# Patient Record
Sex: FEMALE | Race: WHITE | NOT HISPANIC OR LATINO | Employment: STUDENT | URBAN - METROPOLITAN AREA
[De-identification: names, ages, dates, MRNs, and addresses within clinical notes are randomized per-mention and may not be internally consistent; named-entity substitution may affect disease eponyms.]

---
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
## 2025-01-02 ENCOUNTER — OFFICE VISIT (OUTPATIENT)
Dept: PHYSICAL THERAPY | Facility: REHABILITATION | Age: 20
End: 2025-01-02
Payer: COMMERCIAL

## 2025-01-02 DIAGNOSIS — M76.899 QUADRICEPS TENDONITIS: Primary | ICD-10-CM

## 2025-01-02 DIAGNOSIS — G89.29 CHRONIC PAIN OF LEFT KNEE: ICD-10-CM

## 2025-01-02 DIAGNOSIS — M25.562 CHRONIC PAIN OF LEFT KNEE: ICD-10-CM

## 2025-01-02 PROCEDURE — 97112 NEUROMUSCULAR REEDUCATION: CPT

## 2025-01-02 PROCEDURE — 97110 THERAPEUTIC EXERCISES: CPT

## 2025-01-02 PROCEDURE — 97140 MANUAL THERAPY 1/> REGIONS: CPT

## 2025-01-02 NOTE — PROGRESS NOTES
"Daily Note     Today's date: 2025  Patient name: Radha Trevino  : 2005  MRN: [id number]  Referring provider: Nawaf Fontaine DO  Dx:   Encounter Diagnosis     ICD-10-CM    1. Quadriceps tendonitis  M76.899       2. Chronic pain of left knee  M25.562     G89.29           Start Time: 1515  Stop Time: 1600  Total time in clinic (min): 45 minutes    Subjective: Pt states she feels great today and has no pain.       Objective: See treatment diary below      Assessment: Pt was able to progress all exercises performed during today's session with no form compensations or increase in sx. During eccentric step downs and Goblet squats, mild genu valgus observed B/l. Pt was able to mildly correct with VC/TC. Pt has demonstrated decreased sx irritability over the last several sessions, and PT frequency will drop to 1x/week starting this upcoming week. Will monitor status NV and progress as appropriate. Tolerated treatment well. Patient demonstrated fatigue post treatment and would benefit from continued PT      Plan: Continue per plan of care.  Progress treatment as tolerated.  Plan to decreased PT frequency to 1x/week as pt is progressing well and demonstrating decreased symptom irritability.       POC expires Unit limit Auth Expiration date PT/OT + Visit Limit?   3/13/2025  -  BOMN         Visit/Unit Tracking  AUTH Status:  Date 2024      [facility] [facility]  Used 1 2 3       Remaining             Pertinent Findings:      Test / Measure  2024   FOTO (Predicted 99) 98   Decreased Knee Eccentric Control Poor squat mechanics during functional squat       Precautions: None    Access Code: [email or web address]  URL: [email or web address]  Date: 2024  Prepared by: Yair Franklin    Manuals  12          IASTM to quad  NV BM 8'                                                 Neuro Re-Ed             Patient Education 15'            Eccentric STS 10 reps 5\"  1x10 5# DB    2x10 " "10# DB           Gastroc Stretch  3x30\" b/l on slant board 3x30\" on slant board          Quad Stretch 2 bouts 30 sec 2x30\" ea           Eccentric Heel taps   2x10 B                       Ther Ex             TM Warm up  8'   7.5 incline 8' 8.0 incline          Bridges 2x10 single leg 1x10 on pball    1x10 on pball with HS curl 3x10 on pball with HS curl          Reverse Hyper Extension 10x 1x15 BW    1x15 BW with YTB abduction 1x12 YTB  1x12 OTB          Clamshells 2x10 reverse clamshells OTB 2x10 reverse clamshells  GTB 2x12 reverse clamshells BTB          Goblet Squats   1x10 10#    1x10 12#          Lunges  2x10           Titan Knee Extension   2x10 50#          Titan Knee Flexion             RDLs   2x8 15#KB          Side Stepping  3 laps GTB 3 laps GTB          Leg Press  1x10 40#   2x10 70# 1x10 70#  1x10 85#          Ther Activity                                       Gait Training                                       Modalities                                            "

## 2025-01-06 ENCOUNTER — OFFICE VISIT (OUTPATIENT)
Dept: PHYSICAL THERAPY | Facility: REHABILITATION | Age: 20
End: 2025-01-06
Payer: COMMERCIAL

## 2025-01-06 DIAGNOSIS — M76.899 QUADRICEPS TENDONITIS: Primary | ICD-10-CM

## 2025-01-06 DIAGNOSIS — G89.29 CHRONIC PAIN OF LEFT KNEE: ICD-10-CM

## 2025-01-06 DIAGNOSIS — M25.562 CHRONIC PAIN OF LEFT KNEE: ICD-10-CM

## 2025-01-06 PROCEDURE — 97110 THERAPEUTIC EXERCISES: CPT

## 2025-01-06 PROCEDURE — 97140 MANUAL THERAPY 1/> REGIONS: CPT

## 2025-01-06 PROCEDURE — 97112 NEUROMUSCULAR REEDUCATION: CPT

## 2025-01-06 NOTE — PROGRESS NOTES
"Daily Note     Today's date: 2025  Patient name: Radha Trevino  : 2005  MRN: [id number]  Referring provider: Nawaf Fontaine DO  Dx:   Encounter Diagnosis     ICD-10-CM    1. Quadriceps tendonitis  M76.899       2. Chronic pain of left knee  M25.562     G89.29           Start Time: 1515  Stop Time: 1555  Total time in clinic (min): 40 minutes    Subjective: Pt denies any pain nor symptoms throughout tx.       Objective: See treatment diary below      Assessment: Pt had no pain nor exacerbations of symptoms throughout nor post tx. Pt will DC to HEP at next visit. HEP and DOMS were both reviewed. Tolerated treatment well. Patient demonstrated fatigue post treatment and would benefit from continued PT      Plan: Continue per plan of care.  Progress treatment as tolerated.  Plan to decreased PT frequency to 1x/week as pt is progressing well and demonstrating decreased symptom irritability.       POC expires Unit limit Auth Expiration date PT/OT + Visit Limit?   3/13/2025  -  BOMN         Visit/Unit Tracking  AUTH Status:  Date 2024     [employer]  Used 1 2 3 4      Remaining             Pertinent Findings:      Test / Measure  2024   FOTO (Predicted 99) 98   Decreased Knee Eccentric Control Poor squat mechanics during functional squat       Precautions: None    Access Code: [email or web address]  URL: [email or web address]  Date: 2024  Prepared by: Yair Franklin    Manuals          IASTM to quad  NV BM 8'                                                 Neuro Re-Ed             Patient Education 15'            Eccentric STS 10 reps 5\"  1x10 5# DB    2x10 10# DB           Gastroc Stretch  3x30\" b/l on slant board 3x30\" on slant board 3x30\" on slant board         Quad Stretch 2 bouts 30 sec 2x30\" ea           Eccentric Heel taps   2x10 B 2x10 B                      Ther Ex             TM Warm up  8'   7.5 incline 8' 8.0 incline 8' 8.0 incline      "    Bridges 2x10 single leg 1x10 on pball    1x10 on pball with HS curl 3x10 on pball with HS curl 3x10 on pball with HS curl         Reverse Hyper Extension 10x 1x15 BW    1x15 BW with YTB abduction 1x12 YTB  1x12 OTB 2x12 OTB         Clamshells 2x10 reverse clamshells OTB 2x10 reverse clamshells  GTB 2x12 reverse clamshells BTB 2x12 reverse clamshells BTB         Goblet Squats   1x10 10#    1x10 12#          Lunges  2x10           Titan Knee Extension   2x10 50# 2x10 50#         Titan Knee Flexion             RDLs   2x8 15#KB 2x8 15#KB         Side Stepping  3 laps GTB 3 laps GTB 3 laps BTB         Leg Press  1x10 40#   2x10 70# 1x10 70#  1x10 85# 2x10 85#         Ther Activity                                       Gait Training                                       Modalities

## 2025-01-09 ENCOUNTER — APPOINTMENT (OUTPATIENT)
Dept: PHYSICAL THERAPY | Facility: REHABILITATION | Age: 20
End: 2025-01-09
Payer: COMMERCIAL

## 2025-01-13 ENCOUNTER — EVALUATION (OUTPATIENT)
Dept: PHYSICAL THERAPY | Facility: REHABILITATION | Age: 20
End: 2025-01-13
Payer: COMMERCIAL

## 2025-01-13 DIAGNOSIS — M76.899 QUADRICEPS TENDONITIS: Primary | ICD-10-CM

## 2025-01-13 DIAGNOSIS — G89.29 CHRONIC PAIN OF LEFT KNEE: ICD-10-CM

## 2025-01-13 DIAGNOSIS — M25.562 CHRONIC PAIN OF LEFT KNEE: ICD-10-CM

## 2025-01-13 PROCEDURE — 97112 NEUROMUSCULAR REEDUCATION: CPT

## 2025-01-13 PROCEDURE — 97110 THERAPEUTIC EXERCISES: CPT

## 2025-01-13 PROCEDURE — 97164 PT RE-EVAL EST PLAN CARE: CPT

## 2025-01-13 NOTE — PROGRESS NOTES
"Discharge Summary    Today's date: 2025  Patient name: Radha Trevino  : 2005  MRN: [id number]  Referring provider: Nawaf Fontaine DO  Dx:   Encounter Diagnosis     ICD-10-CM    1. Quadriceps tendonitis  M76.899       2. Chronic pain of left knee  M25.562     G89.29             Start Time: 1545  Stop Time: 1630  Total time in clinic (min): 45 minutes    Subjective: Pt states she has been feeling better, and has \"had no achiness at all in her left knee\". Pt has no difficulty performing any ADLs at this point, and she has been able to work pain free. She feels as though she is back to baseline and would like to be discharged at this point. Pt states she is 100% of where she was hoping to get to at the end of her PT services.       Pain:  Current: 0/10  Best: 0/10  Worst: 0/10    Objective: See treatment diary below  Range of Motion: Goniometric revealed the following findings (in degrees).  Joint Motion Right: 2024 Left: 2024 Right: 2024 Left: 2024   Hip Extension WNL WNL WNL WNL   Hip External Rotation WNL WNL WNL WNL   Hip Internal Rotation WNL WNL WNL WNL   Knee Extension WNL WNL WNL WNL   Knee Flexion WNL WNL WNL WNL   Ankle Dorsiflexion WNL WNL WNL WNL   Ankle Plantarflexion WNL WNL WNL WNL     Strength: MMT revealed the following findings.  Joint Motion Right: 2024 Left: 2024 Right: 2024 Left: 2024   Hip Flexion 5/5 4+/5 5/5 5/5   Hip Abduction 4+/5 4/5 5/5 5/5   Hip Extension 5/5 4+/5 5/5 5/5   Knee Extension 5/5 4+/5 5/5 5/5   Knee Flexion 5/5 4+/5 5/5 5/5   Ankle Plantarflexion 5/5 4/5 5/5 5/5   Ankle Dorsiflexion 5/5 4-/5 5/5 5/5       Additional Assessments:  Observation: Nodule observed anterolaterally on the quadriceps tendon, no significant change in size observed from the IE.  Palpation: No significant TTP along left knee musculature, bony landmarks, or nodule.  Joint Mobility: Patellar ROM appeared within normal limits on the LLE with no " increase in sx.  Gait Pattern: No significant findings or pain noted.       Functional Strength/Mobility Exam:   Functional Squat: Significant trunk lean anteriorly, b/l genu valgus. Crepitus observed on the right knee with deep knee flexion.   30 second STS: 19 reps  5xSTS: 6.41 seconds      Special Tests:  Hip Tests (Structure evaluated) Date: 12/19/2024  ( P / N ) Date: 1/13/2024   Alli (Iliapsoas/Rectus Fem) Negative B/l  Negative b/l   90/90 SLR (Hamstring) Negative B/l Negative b/l   Ely (Rectus Femoris) Negative Negative     Knee Tests (Structure evaluated) Date: 12/19/2024  ( P / N )    Lachman (ACL) N left N left   Anterior (ACL) N left N left   Posterior Drawer (PCL) N left N left   Sag (PCL) N left N left   Valgus Stress (MCL) N left N left   Varus Stress (LCL) N left N left   Apley Compression (Menisci) N left NT   Gal (Menisci) N left NT   Thessaly (Menisci) N left NT   Patellar Apprehension   (Patellar Subluxation) N left N left       Assessment: Radha Trevino is a 19 y.o. female being seen at [facility] for PT re-evaluation. Pt states they have made significant progress t/o the current PT plan of care thus far. Pt states they current have no limitations or difficulty with any activities. Pt states they are 100% to their desired level with PT services thus far. Strength assessment showed improved gross b/l LE strength with no reproduction of sx during testing. Patient is able to manage all sx independently and demonstrates good understanding of and proper form with each of the exercises included in HEP. Patient has met all personal and established goals for PT and is deemed safe for d/c. POC will be closed and pt will be provided with a comprehensive HEP to continue performing following d/c. The patient was instructed to contact PT for any change in status.      Plan: Plan to d/c pt from PT services and provide with a comprehensive HEP to perform after discharge.       POC expires Unit limit Auth  "Expiration date PT/OT + Visit Limit?   3/13/2025  -  BOMN         Visit/Unit Tracking  AUTH Status:  Date 12/19/2024 12/30 1/2 1/6 1/13    [employer]  Used 1 2 3 4 5     Remaining             Pertinent Findings:      Test / Measure  12/19/2024 1/13/2025   FOTO (Predicted 99) 98 99   Decreased Knee Eccentric Control Poor squat mechanics during functional squat Poor Squat mechanics during functional squat       Precautions: None    Access Code: [email or web address]  URL: [email or web address]  Date: 12/19/2024  Prepared by: Yair Franklin    Manuals 12/19 12/30 1/2 1/6 1/13        IASTM to quad  NV BM 8'                                                 Neuro Re-Ed             Patient Education 15'            Eccentric STS 10 reps 5\"  1x10 5# DB    2x10 10# DB   2x10 (box squat)        Gastroc Stretch  3x30\" b/l on slant board 3x30\" on slant board 3x30\" on slant board 2x45\" on slant board        Quad Stretch 2 bouts 30 sec 2x30\" ea           Eccentric Heel taps   2x10 B 2x10 B 2x10 on left                     Ther Ex             TM Warm up  8'   7.5 incline 8' 8.0 incline 8' 8.0 incline 5' 8.0 incline        Bridges 2x10 single leg 1x10 on pball    1x10 on pball with HS curl 3x10 on pball with HS curl 3x10 on pball with HS curl 3x10 on pball with HS curl        Reverse Hyper Extension 10x 1x15 BW    1x15 BW with YTB abduction 1x12 YTB  1x12 OTB 2x12 OTB 3x10 OTB        Clamshells 2x10 reverse clamshells OTB 2x10 reverse clamshells  GTB 2x12 reverse clamshells BTB 2x12 reverse clamshells BTB         Goblet Squats   1x10 10#    1x10 12#          Lunges  2x10           Titan Knee Extension   2x10 50# 2x10 50#         Titan Knee Flexion             RDLs   2x8 15#KB 2x8 15#KB         Side Stepping  3 laps GTB 3 laps GTB 3 laps BTB         Leg Press  1x10 40#   2x10 70# 1x10 70#  1x10 85# 2x10 85#         Ther Activity                                       Gait Training                                     "   Modalities

## 2025-03-29 DIAGNOSIS — Z97.5 BREAKTHROUGH BLEEDING ON NEXPLANON: ICD-10-CM

## 2025-03-29 DIAGNOSIS — N92.1 BREAKTHROUGH BLEEDING ON NEXPLANON: ICD-10-CM

## 2025-03-29 DIAGNOSIS — Z30.011 ENCOUNTER FOR INITIAL PRESCRIPTION OF CONTRACEPTIVE PILLS: ICD-10-CM

## 2025-03-31 RX ORDER — DROSPIRENONE AND ETHINYL ESTRADIOL 0.02-3(28)
1 KIT ORAL DAILY
Qty: 28 TABLET | Refills: 0 | Status: SHIPPED | OUTPATIENT
Start: 2025-03-31

## 2025-04-23 DIAGNOSIS — Z97.5 BREAKTHROUGH BLEEDING ON NEXPLANON: ICD-10-CM

## 2025-04-23 DIAGNOSIS — Z30.011 ENCOUNTER FOR INITIAL PRESCRIPTION OF CONTRACEPTIVE PILLS: ICD-10-CM

## 2025-04-23 DIAGNOSIS — N92.1 BREAKTHROUGH BLEEDING ON NEXPLANON: ICD-10-CM

## 2025-04-23 NOTE — TELEPHONE ENCOUNTER
Was not made aware she needed an appointment. Patient warmed transferred to scheduling at this time. Inquiring on a refill to get her to that time.     Reason for call:   [x] Refill   [] Prior Auth  [] Other:     Office:   [] PCP/Provider -   [x] Specialty/Provider - Ana Wilson PA-C / [facility] [place]     Medication: drospirenone-ethinyl estradiol (Vestura) 3-0.02 MG per tablet / TAKE 1 TABLET BY MOUTH EVERY DAY     Pharmacy: [facility]/pharmacy #[id number] - [place], [place] - [address]     Local Pharmacy   Does the patient have enough for 3 days?   [] Yes   [x] No - Send as HP to POD

## 2025-04-24 RX ORDER — DROSPIRENONE AND ETHINYL ESTRADIOL 0.02-3(28)
1 KIT ORAL DAILY
Qty: 28 TABLET | Refills: 0 | Status: SHIPPED | OUTPATIENT
Start: 2025-04-24 | End: 2025-04-29 | Stop reason: SDUPTHER

## 2025-04-29 ENCOUNTER — OFFICE VISIT (OUTPATIENT)
Dept: OBGYN CLINIC | Facility: CLINIC | Age: 20
End: 2025-04-29
Payer: COMMERCIAL

## 2025-04-29 VITALS
SYSTOLIC BLOOD PRESSURE: 112 MMHG | HEIGHT: 63 IN | DIASTOLIC BLOOD PRESSURE: 80 MMHG | BODY MASS INDEX: 17.4 KG/M2 | WEIGHT: 98.2 LBS

## 2025-04-29 DIAGNOSIS — N92.1 BREAKTHROUGH BLEEDING ON NEXPLANON: ICD-10-CM

## 2025-04-29 DIAGNOSIS — Z97.5 BREAKTHROUGH BLEEDING ON NEXPLANON: ICD-10-CM

## 2025-04-29 DIAGNOSIS — Z01.419 WELL WOMAN EXAM: Primary | ICD-10-CM

## 2025-04-29 DIAGNOSIS — Z30.011 ENCOUNTER FOR INITIAL PRESCRIPTION OF CONTRACEPTIVE PILLS: ICD-10-CM

## 2025-04-29 PROCEDURE — 99395 PREV VISIT EST AGE 18-39: CPT | Performed by: PHYSICIAN ASSISTANT

## 2025-04-29 RX ORDER — DROSPIRENONE AND ETHINYL ESTRADIOL 0.02-3(28)
1 KIT ORAL DAILY
Qty: 84 TABLET | Refills: 3 | Status: SHIPPED | OUTPATIENT
Start: 2025-04-29 | End: 2025-07-22

## 2025-04-29 NOTE — PROGRESS NOTES
ASSESSMENT & PLAN:   Diagnoses and all orders for this visit:    Well woman exam    Encounter for initial prescription of contraceptive pills  -     drospirenone-ethinyl estradiol (Vestura) 3-0.02 MG per tablet; Take 1 tablet by mouth daily    Breakthrough bleeding on Nexplanon  -     drospirenone-ethinyl estradiol (Vestura) 3-0.02 MG per tablet; Take 1 tablet by mouth daily          The following were reviewed in today's visit: ASCCP guidelines (Pap screening at age 21), Gardasil vaccination, STD testing breast self exam, STD testing, exercise, and healthy diet.    Patient to return to office in yearly for annual exam.     All questions have been answered to her satisfaction.        CC:  Annual Gynecologic Examination  Chief Complaint   Patient presents with    Follow-up     OCP f/u        HPI: Radha Trevino is a 20 y.o.  who presents for annual gynecologic examination.  She has the following concerns:  none at this time. Doing well on her COCP. Needs a refill.       Health Maintenance:    Exercise: frequently  Breast exams/breast awareness: yes    History reviewed. No pertinent past medical history.    Past Surgical History:   Procedure Laterality Date    NO PAST SURGERIES         Past OB/Gyn History:   Patient's last menstrual period was 2025.    Pap not indicated, start screening at age 21.   HPV vaccine completed: Yes    Patient is currently sexually active.   STD testing: no  Current contraception: cOCP (estrogen/progesterone)      Family History  Family History   Problem Relation Age of Onset    No Known Problems Mother     No Known Problems Father     No Known Problems Brother     No Known Problems Brother     No Known Problems Brother     Diabetes Maternal Grandmother     Hypertension Maternal Grandmother     Coronary artery disease Maternal Grandfather 50    Diabetes Maternal Grandfather     Hypertension Maternal Grandfather     Cancer Paternal Grandfather 70        kidney cancer     Hyperlipidemia Paternal Grandfather     Prostate cancer Paternal Grandfather 60    Mental illness Neg Hx     Substance Abuse Neg Hx        Family history of uterine or ovarian cancer: no  Family history of breast cancer: no  Family history of colon cancer: no    Social History:  Social History     Socioeconomic History    Marital status: Single     Spouse name: Not on file    Number of children: Not on file    Years of education: Not on file    Highest education level: Not on file   Occupational History    Not on file   Tobacco Use    Smoking status: Never     Passive exposure: Never    Smokeless tobacco: Never   Vaping Use    Vaping status: Never Used   Substance and Sexual Activity    Alcohol use: Never     Comment: Denies    Drug use: Not Currently     Types: Marijuana    Sexual activity: Yes     Partners: Male     Birth control/protection: Condom Male, OCP   Other Topics Concern    Not on file   Social History Narrative    Not on file     Social Drivers of Health     Financial Resource Strain: Not on file   Food Insecurity: Not on file   Transportation Needs: Not on file   Physical Activity: Not on file   Stress: Not on file   Social Connections: Not on file   Intimate Partner Violence: Not on file   Housing Stability: Not on file     Domestic violence screen: negative    Allergies:  No Known Allergies    Medications:    Current Outpatient Medications:     benzoyl peroxide-erythromycin (Benzamycin) gel, Apply to affected area 2 times daily, Disp: 47 g, Rfl: 2    drospirenone-ethinyl estradiol (Vestura) 3-0.02 MG per tablet, Take 1 tablet by mouth daily, Disp: 84 tablet, Rfl: 3    Review of Systems:  Review of Systems   Constitutional:  Negative for chills, fever and unexpected weight change.   Respiratory:  Negative for shortness of breath.    Cardiovascular:  Negative for chest pain.   Gastrointestinal:  Negative for abdominal pain, diarrhea, nausea and vomiting.   Skin:  Negative for rash.  "  Psychiatric/Behavioral:  Negative for dysphoric mood. The patient is not nervous/anxious.          Physical Exam:  /80 (BP Location: Left arm, Patient Position: Sitting, Cuff Size: Standard)   Ht 5' 2.5\" (1.588 m)   Wt 44.5 kg (98 lb 3.2 oz)   LMP 04/05/2025   BMI 17.67 kg/m²    Physical Exam  Constitutional:       General: She is not in acute distress.     Appearance: Normal appearance. She is normal weight. She is not ill-appearing, toxic-appearing or diaphoretic.   HENT:      Head: Normocephalic and atraumatic.   Eyes:      Conjunctiva/sclera: Conjunctivae normal.   Cardiovascular:      Rate and Rhythm: Normal rate and regular rhythm.      Heart sounds: No murmur heard.     No friction rub. No gallop.   Pulmonary:      Effort: Pulmonary effort is normal.      Breath sounds: Normal breath sounds. No wheezing, rhonchi or rales.   Musculoskeletal:      Cervical back: Neck supple.   Neurological:      General: No focal deficit present.      Mental Status: She is alert.   Skin:     General: Skin is warm and dry.   Psychiatric:         Mood and Affect: Mood normal.         Behavior: Behavior normal.   Vitals reviewed.                              "

## 2025-05-29 ENCOUNTER — NURSE TRIAGE (OUTPATIENT)
Age: 20
End: 2025-05-29

## 2025-05-29 NOTE — TELEPHONE ENCOUNTER
"REASON FOR CONVERSATION: Headache    SYMPTOMS: HA daily since 5/25. Relieved with advil. Denies neuro symptoms. See triage below.    OTHER HEALTH INFORMATION: n/a    PROTOCOL DISPOSITION: See Today or Tomorrow in Office    CARE ADVICE PROVIDED: See in office today or tomorrow. Advised Pt to proceed to Emergency Department with new or worsening of sypmtoms as reviewed, including change in vision, unilateral weakness, change in speech, etc. Pt verbalized understanding and is agreeable with plan.      PRACTICE FOLLOW-UP: Appointments seen in office for tomorrow, but Pt unable to schedule unless there is an opening after 2 pm tomorrow. Please review and return call to Pt with appointment options. If unavailable, please review with PCP and advise if Pt can wait until next week to be seen in office or proceed to Urgent Care sooner?      Reason for Disposition   MODERATE headache (e.g., interferes with normal activities) present > 24 hours and unexplained    Answer Assessment - Initial Assessment Questions  1. LOCATION: \"Where does it hurt?\"       At first headache was more to the right side and sharp      Currently HA is across front of head rates 4-5/10 and aching     2. ONSET: \"When did the headache start?\" (e.g., minutes, hours, days)       HA started Sunday 5/25 and has been daily since. Pain is relieved with advil liquid gels, but returns.     3. RECURRENT SYMPTOM: \"Have you ever had headaches before?\" If Yes, ask: \"When was the last time?\" and \"What happened that time?\"       Pt denies history of the same    4. CAUSE: \"What do you think is causing the headache?\"      Unsure    5. HEAD INJURY: \"Has there been any recent injury to the head?\"       Does not report    6. OTHER SYMPTOMS: \"Do you have any other symptoms?\" (e.g., fever, stiff neck, eye pain, sore throat, cold symptoms)      Denies any other symptoms including weakness, nausea, sensitivity to light/sound, trouble concentrating, or vision changes. Speech is " clear. Pt is oriented and appropriate.    Protocols used: Headache-Adult-[place]

## 2025-05-30 NOTE — TELEPHONE ENCOUNTER
Tried to contact patient on the cell and home number-cell wasn't taking calls and the home number doesn't have a voicemail set up.

## 2025-05-31 ENCOUNTER — HOSPITAL ENCOUNTER (EMERGENCY)
Facility: HOSPITAL | Age: 20
Discharge: HOME | End: 2025-05-31
Attending: EMERGENCY MEDICINE | Admitting: EMERGENCY MEDICINE
Payer: COMMERCIAL

## 2025-05-31 ENCOUNTER — APPOINTMENT (EMERGENCY)
Dept: CT IMAGING | Facility: HOSPITAL | Age: 20
End: 2025-05-31
Payer: COMMERCIAL

## 2025-05-31 VITALS
SYSTOLIC BLOOD PRESSURE: 131 MMHG | HEART RATE: 83 BPM | DIASTOLIC BLOOD PRESSURE: 85 MMHG | TEMPERATURE: 97.2 F | OXYGEN SATURATION: 95 % | RESPIRATION RATE: 16 BRPM

## 2025-05-31 DIAGNOSIS — R51.9 HEADACHE: Primary | ICD-10-CM

## 2025-05-31 LAB
EXT PREGNANCY TEST URINE: NEGATIVE
EXT. CONTROL: NORMAL

## 2025-05-31 PROCEDURE — 70450 CT HEAD/BRAIN W/O DYE: CPT

## 2025-05-31 PROCEDURE — 99284 EMERGENCY DEPT VISIT MOD MDM: CPT

## 2025-05-31 PROCEDURE — 81025 URINE PREGNANCY TEST: CPT

## 2025-05-31 PROCEDURE — 99283 EMERGENCY DEPT VISIT LOW MDM: CPT

## 2025-05-31 RX ORDER — METOCLOPRAMIDE 10 MG/1
10 TABLET ORAL ONCE
Status: COMPLETED | OUTPATIENT
Start: 2025-05-31 | End: 2025-05-31

## 2025-05-31 RX ORDER — DIPHENHYDRAMINE HCL 25 MG
25 TABLET ORAL ONCE
Status: COMPLETED | OUTPATIENT
Start: 2025-05-31 | End: 2025-05-31

## 2025-05-31 RX ORDER — ACETAMINOPHEN 325 MG/1
650 TABLET ORAL ONCE
Status: COMPLETED | OUTPATIENT
Start: 2025-05-31 | End: 2025-05-31

## 2025-05-31 RX ORDER — ACETAMINOPHEN 325 MG/1
650 TABLET ORAL EVERY 6 HOURS PRN
Qty: 60 TABLET | Refills: 0 | Status: SHIPPED | OUTPATIENT
Start: 2025-05-31

## 2025-05-31 RX ORDER — IBUPROFEN 600 MG/1
600 TABLET, FILM COATED ORAL EVERY 6 HOURS PRN
Qty: 60 TABLET | Refills: 0 | Status: SHIPPED | OUTPATIENT
Start: 2025-05-31

## 2025-05-31 RX ADMIN — ACETAMINOPHEN 650 MG: 325 TABLET ORAL at 21:53

## 2025-05-31 RX ADMIN — METOCLOPRAMIDE 10 MG: 10 TABLET ORAL at 21:53

## 2025-05-31 RX ADMIN — DIPHENHYDRAMINE HYDROCHLORIDE 25 MG: 25 TABLET ORAL at 21:53

## 2025-06-01 NOTE — DISCHARGE INSTRUCTIONS
You may take Tylenol and ibuprofen as needed for headache.    Please return to the ED with any new or worsening symptoms.   monitored anesthesia care (MAC)

## 2025-06-01 NOTE — ED PROVIDER NOTES
"Time reflects when diagnosis was documented in both MDM as applicable and the Disposition within this note       Time User Action Codes Description Comment    5/31/2025 10:50 PM Kristine Sullivna Add [R51.9] Headache           ED Disposition       ED Disposition   Discharge    Condition   Stable    Date/Time   Sat May 31, 2025 10:50 PM    Comment   Radha Trevino discharge to home/self care.                   Assessment & Plan       Medical Decision Making  20-year-old female presenting for evaluation of headache constant for the past week.  See HPI.  On exam patient well-appearing, with stable, afebrile, no apparent distress.  Alert and oriented, GCS 15, no focal neurodeficits.  Ambulates in the ED without difficulty. Suspect tension headache vs migraine, will check CT head as this is new onset headache to rule out SAH, SDH, or other intracranial abnormality.  Modified migraine cocktail given orally.  CT head negative, patient reassessed with significant improvement in pain, reporting 2 out of 10.  Requesting discharge at this time.  Advised may take Tylenol and ibuprofen at home as needed for headache.  Return precautions provided, patient discharged home with self-care.    Amount and/or Complexity of Data Reviewed  Labs: ordered.  Radiology: ordered.    Risk  OTC drugs.  Prescription drug management.             Medications   acetaminophen (TYLENOL) tablet 650 mg (650 mg Oral Given 5/31/25 2153)   metoclopramide (REGLAN) tablet 10 mg (10 mg Oral Given 5/31/25 2153)   diphenhydrAMINE (BENADRYL) tablet 25 mg (25 mg Oral Given 5/31/25 2153)       ED Risk Strat Scores              CRAFFT      Flowsheet Row Most Recent Value   CRAFFT Initial Screen: During the past 12 months, did you:    1. Drink any alcohol (more than a few sips)?  No Filed at: 05/31/2025 2039   2. Smoke any marijuana or hashish No Filed at: 05/31/2025 2039   3. Use anything else to get high? (\"anything else\" includes illegal drugs, over the counter " and prescription drugs, and things that you sniff or 'blood')? No Filed at: 05/31/2025 2039              No data recorded                            History of Present Illness       Chief Complaint   Patient presents with    Headache     Pt c/o headache that started on Sunday, denies any blurry vision       Past Medical History[1]   Past Surgical History[2]   Family History[3]   Social History[4]   E-Cigarette/Vaping    E-Cigarette Use Never User       E-Cigarette/Vaping Substances    Nicotine No     THC No     CBD No     Flavoring No     Other No     Unknown No       I have reviewed and agree with the history as documented.     Patient is a 20-year-old female presenting for evaluation of headache for the past week.  States that headache was present when she woke up a week ago, has been constant since.  No relief with ibuprofen.  Believes it started on right side and now encompasses her entire forehead.  Denies any vision changes, double vision, blurred vision, fever, chills, nausea, vomiting, weakness, numbness, lightheadedness, dizziness.      Headache  Associated symptoms: no dizziness, no fever, no nausea, no neck pain, no photophobia and no vomiting        Review of Systems   Constitutional:  Negative for chills and fever.   Eyes:  Negative for photophobia and visual disturbance.   Respiratory:  Negative for shortness of breath.    Cardiovascular:  Negative for chest pain.   Gastrointestinal:  Negative for nausea and vomiting.   Musculoskeletal:  Negative for neck pain.   Neurological:  Positive for headaches. Negative for dizziness and light-headedness.           Objective       ED Triage Vitals   Temperature Pulse Blood Pressure Respirations SpO2 Patient Position - Orthostatic VS   05/31/25 2037 05/31/25 2037 05/31/25 2037 05/31/25 2037 05/31/25 2037 05/31/25 2037   (!) 97.2 °F (36.2 °C) 86 130/85 16 96 % Sitting      Temp Source Heart Rate Source BP Location FiO2 (%) Pain Score    05/31/25 2037 05/31/25 2037  05/31/25 2037 -- 05/31/25 2130    Temporal Monitor Left arm  8      Vitals      Date and Time Temp Pulse SpO2 Resp BP Pain Score FACES Pain Rating User   05/31/25 2240 -- 83 95 % 16 131/85 2 -- KW   05/31/25 2130 -- -- -- -- -- 8 -- ELIDIA   05/31/25 2037 97.2 °F (36.2 °C) 86 96 % 16 130/85 -- -- NO            Physical Exam  Vitals and nursing note reviewed.   Constitutional:       General: She is not in acute distress.     Appearance: She is well-developed.   HENT:      Head: Normocephalic and atraumatic.     Eyes:      General: No visual field deficit.     Conjunctiva/sclera: Conjunctivae normal.       Cardiovascular:      Rate and Rhythm: Normal rate and regular rhythm.      Heart sounds: No murmur heard.  Pulmonary:      Effort: Pulmonary effort is normal. No respiratory distress.      Breath sounds: Normal breath sounds.   Abdominal:      Palpations: Abdomen is soft.      Tenderness: There is no abdominal tenderness.     Musculoskeletal:         General: No swelling.      Cervical back: Neck supple.     Skin:     General: Skin is warm and dry.      Capillary Refill: Capillary refill takes less than 2 seconds.     Neurological:      General: No focal deficit present.      Mental Status: She is alert and oriented to person, place, and time.      GCS: GCS eye subscore is 4. GCS verbal subscore is 5. GCS motor subscore is 6.      Cranial Nerves: Cranial nerves 2-12 are intact. No cranial nerve deficit, dysarthria or facial asymmetry.      Sensory: Sensation is intact.      Motor: Motor function is intact.      Coordination: Coordination is intact.     Psychiatric:         Mood and Affect: Mood normal.         Results Reviewed       Procedure Component Value Units Date/Time    POCT pregnancy, urine [[id number]]  (Normal) Collected: 05/31/25 2141    Lab Status: Final result Updated: 05/31/25 2141     EXT Preg Test, Ur Negative     Control Valid            CT head wo contrast   Final Interpretation by Mark Medina,  MD (05/31 2232)      No acute intracranial abnormality.                  Workstation performed: [id number]             Procedures    ED Medication and Procedure Management   Prior to Admission Medications   Prescriptions Last Dose Informant Patient Reported? Taking?   benzoyl peroxide-erythromycin (Benzamycin) gel  Self No No   Sig: Apply to affected area 2 times daily   drospirenone-ethinyl estradiol (Vestura) 3-0.02 MG per tablet   No No   Sig: Take 1 tablet by mouth daily      Facility-Administered Medications: None     Discharge Medication List as of 5/31/2025 11:05 PM        START taking these medications    Details   acetaminophen (TYLENOL) 325 mg tablet Take 2 tablets (650 mg total) by mouth every 6 (six) hours as needed for mild pain or moderate pain, Starting Sat 5/31/2025, Normal      ibuprofen (MOTRIN) 600 mg tablet Take 1 tablet (600 mg total) by mouth every 6 (six) hours as needed for mild pain or moderate pain, Starting Sat 5/31/2025, Normal           CONTINUE these medications which have NOT CHANGED    Details   benzoyl peroxide-erythromycin (Benzamycin) gel Apply to affected area 2 times daily, Normal      drospirenone-ethinyl estradiol (Vestura) 3-0.02 MG per tablet Take 1 tablet by mouth daily, Starting Tue 4/29/2025, Until Tue 7/22/2025, Normal           No discharge procedures on file.  ED SEPSIS DOCUMENTATION   Time reflects when diagnosis was documented in both MDM as applicable and the Disposition within this note       Time User Action Codes Description Comment    5/31/2025 10:50 PM Kristine Sullivan Add [R51.9] Headache                    [1] No past medical history on file.  [2]   Past Surgical History:  Procedure Laterality Date    NO PAST SURGERIES     [3]   Family History  Problem Relation Name Age of Onset    No Known Problems Mother      No Known Problems Father      No Known Problems Brother      No Known Problems Brother      No Known Problems Brother      Diabetes Maternal Grandmother       Hypertension Maternal Grandmother      Coronary artery disease Maternal Grandfather  50    Diabetes Maternal Grandfather      Hypertension Maternal Grandfather      Cancer Paternal Grandfather  70        kidney cancer    Hyperlipidemia Paternal Grandfather      Prostate cancer Paternal Grandfather  60    Mental illness Neg Hx      Substance Abuse Neg Hx     [4]   Social History  Tobacco Use    Smoking status: Never     Passive exposure: Never    Smokeless tobacco: Never   Vaping Use    Vaping status: Never Used   Substance Use Topics    Alcohol use: Never     Comment: Denies    Drug use: Not Currently     Types: Marijuana        Kristine Sullivan PA-C  06/01/25 0756

## 2025-06-02 ENCOUNTER — OFFICE VISIT (OUTPATIENT)
Dept: FAMILY MEDICINE CLINIC | Facility: CLINIC | Age: 20
End: 2025-06-02
Payer: COMMERCIAL

## 2025-06-02 VITALS
DIASTOLIC BLOOD PRESSURE: 70 MMHG | HEIGHT: 63 IN | HEART RATE: 104 BPM | SYSTOLIC BLOOD PRESSURE: 108 MMHG | BODY MASS INDEX: 17.65 KG/M2 | WEIGHT: 99.6 LBS | TEMPERATURE: 98.4 F | OXYGEN SATURATION: 98 % | RESPIRATION RATE: 18 BRPM

## 2025-06-02 DIAGNOSIS — G44.52 NEW DAILY PERSISTENT HEADACHE: Primary | ICD-10-CM

## 2025-06-02 PROCEDURE — 99213 OFFICE O/P EST LOW 20 MIN: CPT | Performed by: NURSE PRACTITIONER

## 2025-06-02 NOTE — PROGRESS NOTES
Name: Radha Trevino      : 2005      MRN: [id number]  Encounter Provider: NIMA Gallo  Encounter Date: 2025   Encounter department: [facility]  :  Assessment & Plan  New daily persistent headache  Encouraged to have eye exam. Last eye exam was at least 2-3 years ago. Does not currently wear glasses or contacts.   Encouraged minimal caffeine intake, adequate hydration, eating regularly and a healthy diet. Prescribed Magnesium daily, which she can obtain over the counter. If headache persist after eye exam and use of magnesium then can consider referral to Neurology for further evaluation.   CT scan in ER on 25:  Impression:       No acute intracranial abnormality.                Depression Screening and Follow-up Plan: Patient was screened for depression during today's encounter. They screened negative with a PHQ-2 score of 0.        History of Present Illness   Presents with reports of headaches daily for the past week. Wakes up with a headache. Takes Advil which helps relieve symptoms but they return.  Denies n/v, photophobia, sound sensitivity.  Does not wear contacts or glasses- last eye exam was in high school  Reports adequate hydration during the day. States she eats 3 meals a day, and only has one caffeine beverage in the morning (usually soda)   Reports pain 2/10 currently and all pain is located in her forehead area.  Seen in ER on  and given IV Benadryl and Reglan along with Tylenol 650mg which resolved symptoms but headache returned  morning. CT scan of her head in the ER was unremarkable.         Review of Systems   Constitutional:  Negative for chills and fever.   HENT:  Negative for ear pain and sore throat.    Eyes:  Negative for pain and visual disturbance.   Respiratory:  Negative for cough and shortness of breath.    Cardiovascular:  Negative for chest pain and palpitations.   Gastrointestinal:  Negative for abdominal pain and vomiting.  "  Genitourinary:  Negative for dysuria and hematuria.   Musculoskeletal:  Negative for arthralgias and back pain.   Skin:  Negative for color change and rash.   Neurological:  Positive for headaches (currently rates 2/10, located frontal area of head). Negative for seizures and syncope.   All other systems reviewed and are negative.      Objective   /70   Pulse 104   Temp 98.4 °F (36.9 °C) (Temporal)   Resp 18   Ht 5' 3\" (1.6 m)   Wt 45.2 kg (99 lb 9.6 oz)   SpO2 98%   BMI 17.64 kg/m²      Physical Exam  Vitals reviewed.   Constitutional:       Appearance: Normal appearance. She is underweight.   HENT:      Head: Normocephalic.      Nose: Nose normal.      Mouth/Throat:      Mouth: Mucous membranes are moist.      Pharynx: Oropharynx is clear.     Eyes:      Extraocular Movements: Extraocular movements intact.      Pupils: Pupils are equal, round, and reactive to light.       Cardiovascular:      Rate and Rhythm: Normal rate and regular rhythm.      Pulses: Normal pulses.      Heart sounds: Normal heart sounds.   Pulmonary:      Effort: Pulmonary effort is normal.      Breath sounds: Normal breath sounds.     Musculoskeletal:         General: Normal range of motion.     Skin:     General: Skin is warm and dry.      Capillary Refill: Capillary refill takes less than 2 seconds.     Neurological:      General: No focal deficit present.      Mental Status: She is alert and oriented to person, place, and time. Mental status is at baseline.     Psychiatric:         Mood and Affect: Mood normal.         Behavior: Behavior normal.         Thought Content: Thought content normal.         Judgment: Judgment normal.         "

## 2025-06-02 NOTE — PATIENT INSTRUCTIONS
"  Schedule eye exam     Patient Education     Headaches in adults   The Basics   Written by the doctors and editors at [facility]   Are there different types of headaches? -- Yes. There are different types of headaches. The most common types are:   Tension headaches - These cause pressure or tightness on both sides of the head.   Migraine headaches - Migraine is a condition that involves a headache as well as other symptoms. Migraine headaches, or \"attacks,\" often start mild and then get worse. They often affect just 1 side of the head. The pain often feels like it is pounding or throbbing. Routine activities like walking or climbing stairs can make the headache worse. Migraine can also cause nausea or vomiting, or make you sensitive to light and sound.   Cluster headaches - These affect 1 side of the head. They start quickly, happen frequently, and are very painful. They also cause symptoms on the same side of the face where the headache is. For example, you might get a droopy or watery eye, a stuffy nose, or facial sweating on 1 side.   Secondary headaches - Sometimes, a headache is related to another health problem. Doctors call this \"secondary headache.\" For example, infections, illnesses, or medicines can cause a headache. Your doctor or nurse will help figure out if your headache is related to another condition.  What can I do to feel better? -- Some people feel better if they:   Take non-prescription pain medicines - Check with your doctor first if you have a health condition or already take prescription medicines.   Rest in a cool, dark, quiet room - This works best for migraine attacks. Some people find it helps to put a cold compress on their head or neck.  Should I see a doctor or nurse? -- Call for an ambulance (in the [place] and [place], call 9-1-1) if:   Your headache starts suddenly, quickly becomes severe, or could be described as \"the worst headache of your life.\"   You also have a seizure, personality " "changes, or confusion, or you pass out.   You have weakness, numbness, trouble speaking, or trouble seeing. (Migraine can sometimes cause these symptoms, but you should be seen right away the first time that these symptoms happen.)  See a doctor or nurse if:   You get frequent or severe headaches.   Your headache began after you exercised or had a minor injury.   You have new headaches, especially if you are pregnant or older than 50.   You have a fever or stiff neck with your headache.  Depending on your symptoms, your doctor or nurse might want to do tests. This can help them figure out if your headache might be related to another health problem.  What might be causing my headaches? -- Some people find that their headaches are triggered by certain foods or things they do. To get an idea of what might be causing your headaches, you can keep a \"headache diary.\" This involves writing down every time you have a headache and what you ate and did before it started.  Some common headache triggers include:   Stress   Skipping meals or eating too little   Having too little or too much caffeine   Sleeping too much or too little   Drinking alcohol   Certain foods or drinks   Not drinking enough water  You can also write down what medicine you took for the headache and whether or not it helped.  What can I do to keep from getting headaches? -- If you know what things trigger your headache, avoid those things if possible. For example, it might help to:   Change your eating or sleeping patterns.   Learn relaxation techniques and healthy ways to manage stress.   Make healthy lifestyle changes, like quitting smoking and getting more physical activity.  If your headaches are frequent, severe, or long-lasting, your doctor can suggest ways to try to prevent them. In some cases, medicines can also help.  How are headaches treated? -- There are lots of medicines that can ease the pain of headaches. You can try taking acetaminophen " (sample brand name: Tylenol), ibuprofen (sample brand names: Advil, Motrin), or naproxen (sample brand name: Aleve). There are prescription medicines that can help with pain, too.  The right treatment for you will depend on what type of headaches you get, how often you get them, and how bad they are. Some medicines are used to treat headaches, and others are taken every day to try to prevent headaches.  If you get headaches often, work with your doctor to find a treatment that helps. Do not try to manage frequent headaches on your own with non-prescription pain medicines. Taking these too often can actually cause more headaches later.  All topics are updated as new evidence becomes available and our peer review process is complete.  This topic retrieved from [employer] on: Mar 24, 2024.  Topic 68037 Version 10.0  Release: 32.2.4 - C32.82  © 2024 UpToDate, Inc. and/or its affiliates. All rights reserved.  Consumer Information Use and Disclaimer   Disclaimer: This generalized information is a limited summary of diagnosis, treatment, and/or medication information. It is not meant to be comprehensive and should be used as a tool to help the user understand and/or assess potential diagnostic and treatment options. It does NOT include all information about conditions, treatments, medications, side effects, or risks that may apply to a specific patient. It is not intended to be medical advice or a substitute for the medical advice, diagnosis, or treatment of a health care provider based on the health care provider's examination and assessment of a patient's specific and unique circumstances. Patients must speak with a health care provider for complete information about their health, medical questions, and treatment options, including any risks or benefits regarding use of medications. This information does not endorse any treatments or medications as safe, effective, or approved for treating a specific patient. [employer]  "[employer] and its affiliates disclaim any warranty or liability relating to this information or the use thereof.The use of this information is governed by the Terms of Use, available at https://www.[employer].com/en/know/clinical-effectiveness-terms. 2024© UpToDate, Inc. and its affiliates and/or licensors. All rights reserved.  Copyright   © 2024 UpToDate, Inc. and/or its affiliates. All rights reserved.        Headache management instructions  - When patient has a moderate to severe headache, they should seek rest, initiate relaxation and apply cold compresses to the head.   - Maintain regular sleep schedule. Adults need at least 7-8 hours of uninterrupted a night.   - Limit over the counter medications such as Tylenol, Ibuprofen, Aleve, Excedrin. (No more than 2- 3 times a week or max 10 a month).  - Maintain headache diary.  Free GAYLE for a smart phone, which can be used is \"Migraine kayode\"  - Limit caffeine to 1-2 cups 8 to 16 oz a day or less.  - Avoid dietary trigger. (aged cheese, peanuts, MSG, aspartame and nitrates).  - Patient is to have regular frequent meals to prevent headache onset.    - Please drink at least 64 ounces of water a day to help remain hydrated.     -     magnesium oxide (MAG-OX) 400 mg; 1 tab daily    "